# Patient Record
Sex: FEMALE | Race: ASIAN | URBAN - METROPOLITAN AREA
[De-identification: names, ages, dates, MRNs, and addresses within clinical notes are randomized per-mention and may not be internally consistent; named-entity substitution may affect disease eponyms.]

---

## 2018-07-13 ENCOUNTER — OFFICE VISIT (OUTPATIENT)
Dept: INTERNAL MEDICINE CLINIC | Age: 58
End: 2018-07-13

## 2018-07-13 VITALS
DIASTOLIC BLOOD PRESSURE: 82 MMHG | HEIGHT: 61 IN | OXYGEN SATURATION: 97 % | RESPIRATION RATE: 16 BRPM | TEMPERATURE: 98.3 F | SYSTOLIC BLOOD PRESSURE: 138 MMHG | HEART RATE: 85 BPM | BODY MASS INDEX: 29.76 KG/M2 | WEIGHT: 157.6 LBS

## 2018-07-13 DIAGNOSIS — M17.0 PRIMARY OSTEOARTHRITIS OF BOTH KNEES: Primary | ICD-10-CM

## 2018-07-13 DIAGNOSIS — R21 RASH AND NONSPECIFIC SKIN ERUPTION: ICD-10-CM

## 2018-07-13 DIAGNOSIS — M54.2 NECK PAIN: ICD-10-CM

## 2018-07-13 DIAGNOSIS — R53.82 CHRONIC FATIGUE: ICD-10-CM

## 2018-07-13 NOTE — MR AVS SNAPSHOT
[facility] [address] 
[phone] Patient: Zoila Ballesteros MRN: [id number] EGT:[id number]/7/0056 Visit Information Date & Time Provider Department Dept. Phone Encounter #  
 7/13/2018 12:45 PM Manuel Espinoza MD [facility] Internal Medicine [phone] [id number] Upcoming Health Maintenance Date Due DTaP/Tdap/Td series (1 - Tdap) 1/1/1981 PAP AKA CERVICAL CYTOLOGY 1/1/1981 BREAST CANCER SCRN MAMMOGRAM 1/1/2010 FOBT Q 1 YEAR AGE 50-75 1/1/2010 Influenza Age 5 to Adult 8/1/2018 Allergies as of 7/13/2018  Review Complete On: 7/13/2018 By: Manuel Espinoza MD  
 No Known Allergies Current Immunizations  Never Reviewed No immunizations on file. Not reviewed this visit You Were Diagnosed With   
  
 Codes Comments Primary osteoarthritis of both knees    -  Primary ICD-10-CM: M17.0 ICD-9-CM: 715.16 Neck pain     ICD-10-CM: M54.2 ICD-9-CM: 723.1 Rash and nonspecific skin eruption     ICD-10-CM: R21 
ICD-9-CM: 782.1 Chronic fatigue     ICD-10-CM: R53.82 
ICD-9-CM: 780.79 Vitals BP Pulse Temp Resp Height(growth percentile) Weight(growth percentile) 138/82 (BP 1 Location: Left arm, BP Patient Position: Sitting) 85 98.3 °F (36.8 °C) (Oral) 16 5' 1\" (1.549 m) 157 lb 9.6 oz (71.5 kg) SpO2 BMI OB Status Smoking Status 97% 29.78 kg/m2 Postmenopausal Never Smoker BMI and BSA Data Body Mass Index Body Surface Area  
 29.78 kg/m 2 1.75 m 2 Preferred Pharmacy Pharmacy Name Phone [phone] Your Updated Medication List  
  
Notice  As of 7/13/2018  2:11 PM  
 You have not been prescribed any medications. We Performed the Following CBC W/O DIFF [49681 CPT(R)] METABOLIC PANEL, COMPREHENSIVE [22514 CPT(R)] TSH 3RD GENERATION [32704 CPT(R)] Introducing [facility]! [employer] introduces [employer] patient portal. Now you can access parts of your medical record, email your doctor's office, and request medication refills online. 1. In your internet browser, go to [email or web address] 2. Click on the First Time User? Click Here link in the Sign In box. You will see the New Member Sign Up page. 3. Enter your [employer] Access Code exactly as it appears below. You will not need to use this code after youve completed the sign-up process. If you do not sign up before the expiration date, you must request a new code. · [employer] Access Code: [email or web address] Expires: 10/11/2018  1:27 PM 
 
4. Enter the last four digits of your Social Security Number (xxxx) and Date of Birth (mm/dd/yyyy) as indicated and click Submit. You will be taken to the next sign-up page. 5. Create a [employer] ID. This will be your [employer] login ID and cannot be changed, so think of one that is secure and easy to remember. 6. Create a [employer] password. You can change your password at any time. 7. Enter your Password Reset Question and Answer. This can be used at a later time if you forget your password. 8. Enter your e-mail address. You will receive e-mail notification when new information is available in [address] 9. Click Sign Up. You can now view and download portions of your medical record. 10. Click the Download Summary menu link to download a portable copy of your medical information. If you have questions, please visit the Frequently Asked Questions section of the [employer] website. Remember, [employer] is NOT to be used for urgent needs. For medical emergencies, dial 911. Now available from your iPhone and Android! Please provide this summary of care documentation to your next provider. If you have any questions after today's visit, please call [phone].

## 2018-07-13 NOTE — PROGRESS NOTES
Written by Amina Arnold, as dictated by Dr. Quintin Perdue MD.    Ajay Barraza is a 62 y.o. female. HPI  The patient comes in today to establish care and recently moved to the [address] [place] [address],[address] from [facility] 6 months ago. She has a hx of osteoarthritis and was seen by a physician in [facility] and was given injections, which helped. She has been having pain in both knees, R more than L. Has not taken any oral medication as she is not familiar with medications here. She has been feeling very tired. She is not sure if it is due to stress. She has never been diagnosed with anemia or any other automimmune condition before. She has also been feeling congested and has been seeing rashes off and on. She started experiencing pain in her neck, which is usually worse in the morning and gets better as the day progresses. No current outpatient prescriptions on file prior to visit. No current facility-administered medications on file prior to visit. Family History   Problem Relation Age of Onset    Diabetes Mother     Hypertension Father        Social History     Social History    Marital status:      Spouse name: N/A    Number of children: N/A    Years of education: N/A     Occupational History    Not on file. Social History Main Topics    Smoking status: Never Smoker    Smokeless tobacco: Never Used    Alcohol use No    Drug use: No    Sexual activity: Yes     Partners: Male     Other Topics Concern    Not on file     Social History Narrative    No narrative on file       Review of Systems   Constitutional: Positive for malaise/fatigue. HENT: Positive for congestion. Eyes: Negative for blurred vision and pain. Respiratory: Negative for cough and shortness of breath. Cardiovascular: Negative for chest pain and palpitations. Gastrointestinal: Negative for abdominal pain and heartburn. Genitourinary: Negative for frequency and urgency. Musculoskeletal: Positive for joint pain and neck pain. Negative for myalgias. Skin: Positive for rash. Neurological: Negative for dizziness, tingling, sensory change, weakness and headaches. Psychiatric/Behavioral: Negative for depression, memory loss and substance abuse. Visit Vitals    /82 (BP 1 Location: Left arm, BP Patient Position: Sitting)    Pulse 85    Temp 98.3 °F (36.8 °C) (Oral)    Resp 16    Ht 5' 1\" (1.549 m)    Wt 157 lb 9.6 oz (71.5 kg)    SpO2 97%    BMI 29.78 kg/m2       Physical Exam   Constitutional: She is oriented to person, place, and time. She appears well-developed and well-nourished. No distress. HENT:   Right Ear: External ear normal.   Left Ear: External ear normal.   Eyes: Conjunctivae and EOM are normal. Right eye exhibits no discharge. Left eye exhibits no discharge. Neck: Normal range of motion. Neck supple. Cardiovascular: Normal rate and regular rhythm. Pulmonary/Chest: Effort normal and breath sounds normal. She has no wheezes. Abdominal: Soft. Bowel sounds are normal. There is no tenderness. Musculoskeletal:   BL knee crepitus   Lymphadenopathy:     She has no cervical adenopathy. Neurological: She is alert and oriented to person, place, and time. Skin: She is not diaphoretic. Psychiatric: She has a normal mood and affect. Her behavior is normal.   Nursing note and vitals reviewed. ASSESSMENT and PLAN    ICD-10-CM ICD-9-CM    1. Primary osteoarthritis of both knees M17.0 715.16 Recommended taking Advil, alternating with tylenol arthritis, as she does not have insurance and cannot see ortho at this time. 2. Neck pain M54.2 723.1 Tylenol arthritis should help with this. She cannot have an XR done as she was to pay out of pocket and would like to know the cost.   3. Rash and nonspecific skin eruption R21 782. 1 She should take OTC antihistamine as she is most likely allergic to something at home.    4. Chronic fatigue R53.82 780.79 CBC W/O DIFF      METABOLIC PANEL, COMPREHENSIVE      TSH 3RD GENERATION    Basic labs ordered. Told her it may be stress related. This plan was reviewed with the patient and patient agrees. All questions were answered. This scribe documentation was reviewed by me and accurately reflects the examination and decisions made by me. This note will not be viewable in [address]

## 2021-04-29 ENCOUNTER — OFFICE (OUTPATIENT)
Dept: URBAN - METROPOLITAN AREA CLINIC 76 | Facility: CLINIC | Age: 61
Setting detail: OPHTHALMOLOGY
End: 2021-04-29
Payer: MEDICAID

## 2021-04-29 DIAGNOSIS — H40.032: ICD-10-CM

## 2021-04-29 DIAGNOSIS — H52.4: ICD-10-CM

## 2021-04-29 DIAGNOSIS — E11.9: ICD-10-CM

## 2021-04-29 DIAGNOSIS — H25.12: ICD-10-CM

## 2021-04-29 PROCEDURE — 92136 OPHTHALMIC BIOMETRY: CPT | Performed by: OPHTHALMOLOGY

## 2021-04-29 PROCEDURE — 92015 DETERMINE REFRACTIVE STATE: CPT | Performed by: OPHTHALMOLOGY

## 2021-04-29 PROCEDURE — 92014 COMPRE OPH EXAM EST PT 1/>: CPT | Performed by: OPHTHALMOLOGY

## 2021-04-29 ASSESSMENT — KERATOMETRY
OS_K1K2_AVERAGE: 42.625
OS_AXISANGLE_DEGREES: 30
OD_K1POWER_DIOPTERS: 42.50
OD_K2POWER_DIOPTERS: 42.75
OS_K2POWER_DIOPTERS: 42.75
OS_AXISANGLE_DEGREES: 120
OD_CYLPOWER_DEGREES: 0.25
OS_K1POWER_DIOPTERS: 42.50
OD_K2POWER_DIOPTERS: 42.75
OS_CYLAXISANGLE_DEGREES: 30
OS_K1POWER_DIOPTERS: 42.50
OS_CYLPOWER_DEGREES: 0.25
OS_K2POWER_DIOPTERS: 42.75
OD_AXISANGLE2_DEGREES: 83
OD_K1POWER_DIOPTERS: 42.50
OD_AXISANGLE_DEGREES: 173
OS_AXISANGLE2_DEGREES: 30
OD_CYLAXISANGLE_DEGREES: 83
OD_K1K2_AVERAGE: 42.625
OD_AXISANGLE_DEGREES: 83

## 2021-04-29 ASSESSMENT — REFRACTION_AUTOREFRACTION
OS_CYLINDER: -1.00
OS_SPHERE: +3.00
OD_SPHERE: +1.25
OD_CYLINDER: -1.00
OD_AXIS: 86
OS_AXIS: 97

## 2021-04-29 ASSESSMENT — SPHEQUIV_DERIVED
OS_SPHEQUIV: 2.5
OD_SPHEQUIV: 0.75

## 2021-04-29 ASSESSMENT — AXIALLENGTH_DERIVED
OD_AL: 23.6211
OS_AL: 22.9569

## 2021-04-29 ASSESSMENT — VISUAL ACUITY
OS_BCVA: 20/25
OD_BCVA: 20/80

## 2021-04-29 ASSESSMENT — REFRACTION_MANIFEST
OD_SPHERE: PL
OD_ADD: +2.50
OS_SPHERE: +2.75
OS_VA1: 20/30
OD_VA1: 20/25
OS_ADD: +2.50

## 2021-04-29 ASSESSMENT — CONFRONTATIONAL VISUAL FIELD TEST (CVF)
OD_FINDINGS: FULL
OS_FINDINGS: FULL

## 2021-04-29 ASSESSMENT — TONOMETRY
OD_IOP_MMHG: 16
OS_IOP_MMHG: 19

## 2022-02-11 ENCOUNTER — OFFICE (OUTPATIENT)
Dept: URBAN - METROPOLITAN AREA CLINIC 76 | Facility: CLINIC | Age: 62
Setting detail: OPHTHALMOLOGY
End: 2022-02-11
Payer: MEDICAID

## 2022-02-11 DIAGNOSIS — E11.9: ICD-10-CM

## 2022-02-11 DIAGNOSIS — H40.032: ICD-10-CM

## 2022-02-11 DIAGNOSIS — H25.12: ICD-10-CM

## 2022-02-11 PROCEDURE — 92136 OPHTHALMIC BIOMETRY: CPT | Performed by: STUDENT IN AN ORGANIZED HEALTH CARE EDUCATION/TRAINING PROGRAM

## 2022-02-11 PROCEDURE — 99214 OFFICE O/P EST MOD 30 MIN: CPT | Performed by: STUDENT IN AN ORGANIZED HEALTH CARE EDUCATION/TRAINING PROGRAM

## 2022-02-11 ASSESSMENT — TONOMETRY
OD_IOP_MMHG: 17
OS_IOP_MMHG: 17

## 2022-02-11 ASSESSMENT — REFRACTION_MANIFEST
OS_VA1: 20/25-
OS_AXIS: 95
OD_CYLINDER: -1.00
OS_ADD: +2.50
OD_VA1: 20/25
OD_VA1: 20/20-1
OS_SPHERE: +2.75
OS_SPHERE: +3.00
OD_SPHERE: +1.25
OD_ADD: +2.50
OS_VA1: 20/30
OD_SPHERE: PL
OD_AXIS: 87
OS_CYLINDER: -1.00

## 2022-02-11 ASSESSMENT — SPHEQUIV_DERIVED
OS_SPHEQUIV: 2.625
OD_SPHEQUIV: 0.75
OD_SPHEQUIV: 0.75
OS_SPHEQUIV: 2.5

## 2022-02-11 ASSESSMENT — AXIALLENGTH_DERIVED
OS_AL: 22.9569
OD_AL: 23.6672
OD_AL: 23.6672
OS_AL: 22.9108

## 2022-02-11 ASSESSMENT — REFRACTION_AUTOREFRACTION
OS_SPHERE: +3.00
OD_AXIS: 87
OS_AXIS: 94
OS_CYLINDER: -0.75
OD_CYLINDER: -1.00
OD_SPHERE: +1.25

## 2022-02-11 ASSESSMENT — VISUAL ACUITY
OS_BCVA: 20/25
OD_BCVA: 20/100-2

## 2022-02-11 ASSESSMENT — KERATOMETRY
OS_K1POWER_DIOPTERS: 42.50
OD_K1POWER_DIOPTERS: 42.25
OD_K2POWER_DIOPTERS: 42.75
OS_AXISANGLE_DEGREES: 32
OD_AXISANGLE_DEGREES: 73
OS_K2POWER_DIOPTERS: 42.75

## 2022-02-11 ASSESSMENT — CONFRONTATIONAL VISUAL FIELD TEST (CVF)
OD_FINDINGS: FULL
OS_FINDINGS: FULL

## 2022-05-19 ENCOUNTER — OUTPATIENT HOSPITAL (OUTPATIENT)
Dept: URBAN - METROPOLITAN AREA CLINIC 78 | Facility: CLINIC | Age: 62
Setting detail: OPHTHALMOLOGY
End: 2022-05-19
Payer: MEDICAID

## 2022-05-19 DIAGNOSIS — H25.12: ICD-10-CM

## 2022-05-19 PROCEDURE — 66984 XCAPSL CTRC RMVL W/O ECP: CPT | Performed by: STUDENT IN AN ORGANIZED HEALTH CARE EDUCATION/TRAINING PROGRAM

## 2022-05-20 ENCOUNTER — RX ONLY (RX ONLY)
Age: 62
End: 2022-05-20

## 2022-05-20 ENCOUNTER — OFFICE (OUTPATIENT)
Dept: URBAN - METROPOLITAN AREA CLINIC 76 | Facility: CLINIC | Age: 62
Setting detail: OPHTHALMOLOGY
End: 2022-05-20
Payer: MEDICAID

## 2022-05-20 DIAGNOSIS — E11.9: ICD-10-CM

## 2022-05-20 DIAGNOSIS — Z96.1: ICD-10-CM

## 2022-05-20 DIAGNOSIS — H25.12: ICD-10-CM

## 2022-05-20 DIAGNOSIS — H40.032: ICD-10-CM

## 2022-05-20 PROCEDURE — 99024 POSTOP FOLLOW-UP VISIT: CPT | Performed by: OPHTHALMOLOGY

## 2022-05-20 ASSESSMENT — SPHEQUIV_DERIVED
OS_SPHEQUIV: -0.5
OD_SPHEQUIV: 0.75
OS_SPHEQUIV: 2.5
OD_SPHEQUIV: 0.75

## 2022-05-20 ASSESSMENT — REFRACTION_MANIFEST
OS_SPHERE: +2.75
OS_VA1: 20/25-
OD_VA1: 20/25
OD_AXIS: 87
OD_SPHERE: +1.25
OD_SPHERE: PL
OS_CYLINDER: -1.00
OD_ADD: +2.50
OS_ADD: +2.50
OS_SPHERE: +3.00
OD_VA1: 20/20-1
OS_VA1: 20/30
OD_CYLINDER: -1.00
OS_AXIS: 95

## 2022-05-20 ASSESSMENT — VISUAL ACUITY
OS_BCVA: 20/25
OD_BCVA: 20/60

## 2022-05-20 ASSESSMENT — REFRACTION_AUTOREFRACTION
OS_SPHERE: 0.00
OD_CYLINDER: -1.00
OS_CYLINDER: -1.00
OD_AXIS: 89
OD_SPHERE: +1.25
OS_AXIS: 94

## 2022-05-20 ASSESSMENT — CONFRONTATIONAL VISUAL FIELD TEST (CVF)
OS_FINDINGS: FULL
OD_FINDINGS: FULL

## 2022-05-20 ASSESSMENT — KERATOMETRY
OS_K1POWER_DIOPTERS: 42.50
OD_K2POWER_DIOPTERS: 42.75
OD_K1POWER_DIOPTERS: 42.50
OS_AXISANGLE_DEGREES: 44
OS_K2POWER_DIOPTERS: 42.75
OD_AXISANGLE_DEGREES: 86

## 2022-05-20 ASSESSMENT — AXIALLENGTH_DERIVED
OD_AL: 23.6211
OS_AL: 24.1196
OS_AL: 22.9569
OD_AL: 23.6211

## 2022-05-20 ASSESSMENT — TONOMETRY
OS_IOP_MMHG: 18
OD_IOP_MMHG: 19

## 2022-05-25 ENCOUNTER — OFFICE (OUTPATIENT)
Dept: URBAN - METROPOLITAN AREA CLINIC 76 | Facility: CLINIC | Age: 62
Setting detail: OPHTHALMOLOGY
End: 2022-05-25
Payer: MEDICAID

## 2022-05-25 DIAGNOSIS — Z96.1: ICD-10-CM

## 2022-05-25 DIAGNOSIS — H40.032: ICD-10-CM

## 2022-05-25 DIAGNOSIS — E11.9: ICD-10-CM

## 2022-05-25 PROBLEM — H25.12 CATARACT SENILE NUCLEAR SCLEROSIS; LEFT EYE: Status: RESOLVED | Noted: 2021-04-29 | Resolved: 2022-05-25

## 2022-05-25 PROCEDURE — 99024 POSTOP FOLLOW-UP VISIT: CPT | Performed by: STUDENT IN AN ORGANIZED HEALTH CARE EDUCATION/TRAINING PROGRAM

## 2022-05-25 ASSESSMENT — REFRACTION_MANIFEST
OD_SPHERE: +0.50
OS_AXIS: 95
OD_VA1: 20/20-1
OS_ADD: +2.50
OD_ADD: +2.50
OS_CYLINDER: -1.25
OD_CYLINDER: -0.50
OS_SPHERE: +3.00
OS_CYLINDER: -1.00
OS_VA1: 20/25-
OS_AXIS: 89
OD_VA1: 20/25
OD_AXIS: 83
OS_VA1: 20/25
OD_AXIS: 87
OD_SPHERE: +1.25
OS_SPHERE: -0.25
OD_CYLINDER: -1.00

## 2022-05-25 ASSESSMENT — SPHEQUIV_DERIVED
OD_SPHEQUIV: 0.25
OD_SPHEQUIV: 0.625
OS_SPHEQUIV: -0.875
OD_SPHEQUIV: 0.75
OS_SPHEQUIV: 2.5
OS_SPHEQUIV: -0.875

## 2022-05-25 ASSESSMENT — AXIALLENGTH_DERIVED
OS_AL: 24.322
OD_AL: 23.6701
OS_AL: 24.322
OD_AL: 23.6211
OD_AL: 23.818
OS_AL: 23.0004

## 2022-05-25 ASSESSMENT — REFRACTION_AUTOREFRACTION
OD_CYLINDER: -1.25
OD_SPHERE: +1.25
OD_AXIS: 83
OS_CYLINDER: -1.25
OS_AXIS: 89
OS_SPHERE: -0.25

## 2022-05-25 ASSESSMENT — CONFRONTATIONAL VISUAL FIELD TEST (CVF)
OD_FINDINGS: FULL
OS_FINDINGS: FULL

## 2022-05-25 ASSESSMENT — TONOMETRY
OD_IOP_MMHG: 17
OS_IOP_MMHG: 19

## 2022-05-25 ASSESSMENT — KERATOMETRY
OS_K2POWER_DIOPTERS: 42.50
OD_K2POWER_DIOPTERS: 42.75
OD_K1POWER_DIOPTERS: 42.50
OS_K1POWER_DIOPTERS: 42.50
OS_AXISANGLE_DEGREES: 90
OD_AXISANGLE_DEGREES: 92

## 2022-05-25 ASSESSMENT — VISUAL ACUITY
OS_BCVA: 20/25
OD_BCVA: 20/100

## 2022-06-22 ENCOUNTER — OFFICE (OUTPATIENT)
Dept: URBAN - METROPOLITAN AREA CLINIC 76 | Facility: CLINIC | Age: 62
Setting detail: OPHTHALMOLOGY
End: 2022-06-22
Payer: MEDICAID

## 2022-06-22 DIAGNOSIS — E11.9: ICD-10-CM

## 2022-06-22 DIAGNOSIS — Z96.1: ICD-10-CM

## 2022-06-22 DIAGNOSIS — H40.032: ICD-10-CM

## 2022-06-22 DIAGNOSIS — H52.03: ICD-10-CM

## 2022-06-22 PROCEDURE — 92015 DETERMINE REFRACTIVE STATE: CPT | Performed by: STUDENT IN AN ORGANIZED HEALTH CARE EDUCATION/TRAINING PROGRAM

## 2022-06-22 PROCEDURE — 99024 POSTOP FOLLOW-UP VISIT: CPT | Performed by: STUDENT IN AN ORGANIZED HEALTH CARE EDUCATION/TRAINING PROGRAM

## 2022-06-22 ASSESSMENT — REFRACTION_MANIFEST
OS_CYLINDER: -1.00
OS_VA2: 20/20(J1+)
OD_CYLINDER: -1.00
OD_VA2: 20/20(J1+)
OD_AXIS: 87
OD_SPHERE: +0.50
OS_CYLINDER: -0.75
OS_AXIS: 85
OD_ADD: +2.50
OD_SPHERE: +1.25
OS_AXIS: 85
OS_CYLINDER: -1.25
OD_SPHERE: +3.75
OD_AXIS: 85
OD_VA1: 20/25
OD_CYLINDER: -1.00
OD_AXIS: 83
OD_CYLINDER: -1.00
OS_ADD: +2.50
OS_ADD: +2.50
OS_VA1: 20/25
OD_AXIS: 85
OS_SPHERE: PLANO
OS_AXIS: 95
OD_SPHERE: +1.25
OD_VA1: 20/20-
OS_VA1: 20/20-
OD_VA1: 20/20-1
OD_CYLINDER: -0.50
OS_CYLINDER: -0.75
OS_SPHERE: -0.25
OS_SPHERE: +2.50
OS_VA1: 20/25-
OD_ADD: +2.50
OS_AXIS: 89
OS_SPHERE: +3.00

## 2022-06-22 ASSESSMENT — SPHEQUIV_DERIVED
OD_SPHEQUIV: 3.25
OS_SPHEQUIV: 2.5
OD_SPHEQUIV: 0.75
OD_SPHEQUIV: 0.25
OS_SPHEQUIV: 2.125
OD_SPHEQUIV: 1
OS_SPHEQUIV: -0.875
OD_SPHEQUIV: 0.75

## 2022-06-22 ASSESSMENT — KERATOMETRY
OD_AXISANGLE_DEGREES: 84
OS_K1POWER_DIOPTERS: 42.50
OS_K2POWER_DIOPTERS: 42.75
OD_K2POWER_DIOPTERS: 42.75
OS_AXISANGLE_DEGREES: 64
OD_K1POWER_DIOPTERS: 42.50

## 2022-06-22 ASSESSMENT — AXIALLENGTH_DERIVED
OD_AL: 23.6211
OS_AL: 24.2733
OD_AL: 23.818
OS_AL: 23.096
OD_AL: 22.6835
OD_AL: 23.6211
OS_AL: 22.9569
OD_AL: 23.5239

## 2022-06-22 ASSESSMENT — TONOMETRY
OS_IOP_MMHG: 18
OD_IOP_MMHG: 19

## 2022-06-22 ASSESSMENT — REFRACTION_AUTOREFRACTION
OD_CYLINDER: -1.50
OS_CYLINDER: -1.00
OS_SPHERE: PLANO
OD_AXIS: 81
OD_SPHERE: +1.75
OS_AXIS: 87

## 2022-06-22 ASSESSMENT — CONFRONTATIONAL VISUAL FIELD TEST (CVF)
OD_FINDINGS: FULL
OS_FINDINGS: FULL

## 2022-06-22 ASSESSMENT — VISUAL ACUITY
OD_BCVA: 20/25-2
OS_BCVA: 20/25

## 2025-07-28 LAB
BASOPHILS # BLD: 0.1 K/UL (ref 0–0.1)
BASOPHILS NFR BLD: 0.8 % (ref 0–1)
COMMENT:: NORMAL
DIFFERENTIAL METHOD BLD: ABNORMAL
EOSINOPHIL # BLD: 0.12 K/UL (ref 0–0.4)
EOSINOPHIL NFR BLD: 0.9 % (ref 0–7)
ERYTHROCYTE [DISTWIDTH] IN BLOOD BY AUTOMATED COUNT: 13.1 % (ref 11.5–14.5)
HCT VFR BLD AUTO: 41.3 % (ref 35–47)
HGB BLD-MCNC: 13.8 G/DL (ref 11.5–16)
IMM GRANULOCYTES # BLD AUTO: 0.04 K/UL (ref 0–0.04)
IMM GRANULOCYTES NFR BLD AUTO: 0.3 % (ref 0–0.5)
LYMPHOCYTES # BLD: 1.95 K/UL (ref 0.8–3.5)
LYMPHOCYTES NFR BLD: 15.4 % (ref 12–49)
MCH RBC QN AUTO: 28.8 PG (ref 26–34)
MCHC RBC AUTO-ENTMCNC: 33.4 G/DL (ref 30–36.5)
MCV RBC AUTO: 86 FL (ref 80–99)
MONOCYTES # BLD: 0.65 K/UL (ref 0–1)
MONOCYTES NFR BLD: 5.1 % (ref 5–13)
NEUTS SEG # BLD: 9.81 K/UL (ref 1.8–8)
NEUTS SEG NFR BLD: 77.5 % (ref 32–75)
NRBC # BLD: 0 K/UL (ref 0–0.01)
NRBC BLD-RTO: 0 PER 100 WBC
PLATELET # BLD AUTO: 328 K/UL (ref 150–400)
PMV BLD AUTO: 10.9 FL (ref 8.9–12.9)
RBC # BLD AUTO: 4.8 M/UL (ref 3.8–5.2)
SPECIMEN HOLD: NORMAL
WBC # BLD AUTO: 12.7 K/UL (ref 3.6–11)

## 2025-07-28 PROCEDURE — 96374 THER/PROPH/DIAG INJ IV PUSH: CPT

## 2025-07-28 PROCEDURE — 99285 EMERGENCY DEPT VISIT HI MDM: CPT

## 2025-07-28 PROCEDURE — 85025 COMPLETE CBC W/AUTO DIFF WBC: CPT

## 2025-07-28 PROCEDURE — 80053 COMPREHEN METABOLIC PANEL: CPT

## 2025-07-28 RX ORDER — ONDANSETRON 2 MG/ML
4 INJECTION INTRAMUSCULAR; INTRAVENOUS
Status: COMPLETED | OUTPATIENT
Start: 2025-07-28 | End: 2025-07-29

## 2025-07-28 RX ORDER — MECLIZINE HCL 12.5 MG 12.5 MG/1
25 TABLET ORAL ONCE
Status: COMPLETED | OUTPATIENT
Start: 2025-07-28 | End: 2025-07-29

## 2025-07-28 RX ORDER — 0.9 % SODIUM CHLORIDE 0.9 %
1000 INTRAVENOUS SOLUTION INTRAVENOUS ONCE
Status: COMPLETED | OUTPATIENT
Start: 2025-07-28 | End: 2025-07-29

## 2025-07-28 ASSESSMENT — PAIN SCALES - GENERAL: PAINLEVEL_OUTOF10: 0

## 2025-07-28 ASSESSMENT — PAIN - FUNCTIONAL ASSESSMENT: PAIN_FUNCTIONAL_ASSESSMENT: 0-10

## 2025-07-29 ENCOUNTER — APPOINTMENT (OUTPATIENT)
Facility: HOSPITAL | Age: 65
End: 2025-07-29
Payer: COMMERCIAL

## 2025-07-29 ENCOUNTER — HOSPITAL ENCOUNTER (EMERGENCY)
Facility: HOSPITAL | Age: 65
Discharge: HOME | End: 2025-07-29
Attending: STUDENT IN AN ORGANIZED HEALTH CARE EDUCATION/TRAINING PROGRAM
Payer: COMMERCIAL

## 2025-07-29 VITALS
DIASTOLIC BLOOD PRESSURE: 60 MMHG | OXYGEN SATURATION: 99 % | HEART RATE: 70 BPM | WEIGHT: 138.89 LBS | TEMPERATURE: 97.5 F | SYSTOLIC BLOOD PRESSURE: 110 MMHG | RESPIRATION RATE: 16 BRPM

## 2025-07-29 DIAGNOSIS — R11.0 NAUSEA: ICD-10-CM

## 2025-07-29 DIAGNOSIS — R42 DIZZINESS: Primary | ICD-10-CM

## 2025-07-29 LAB
ALBUMIN SERPL-MCNC: 3.6 G/DL (ref 3.5–5)
ALBUMIN/GLOB SERPL: 0.9 (ref 1.1–2.2)
ALP SERPL-CCNC: 100 U/L (ref 45–117)
ALT SERPL-CCNC: 23 U/L (ref 12–78)
ANION GAP SERPL CALC-SCNC: 5 MMOL/L (ref 2–12)
APPEARANCE UR: CLEAR
AST SERPL-CCNC: 14 U/L (ref 15–37)
BACTERIA URNS QL MICRO: NEGATIVE /HPF
BILIRUB SERPL-MCNC: 0.3 MG/DL (ref 0.2–1)
BILIRUB UR QL: NEGATIVE
BUN SERPL-MCNC: 12 MG/DL (ref 6–20)
BUN/CREAT SERPL: 21 (ref 12–20)
CALCIUM SERPL-MCNC: 9.2 MG/DL (ref 8.5–10.1)
CHLORIDE SERPL-SCNC: 106 MMOL/L (ref 97–108)
CO2 SERPL-SCNC: 25 MMOL/L (ref 21–32)
COLOR UR: NORMAL
CREAT SERPL-MCNC: 0.58 MG/DL (ref 0.55–1.02)
EPITH CASTS URNS QL MICRO: NORMAL /LPF
GLOBULIN SER CALC-MCNC: 3.8 G/DL (ref 2–4)
GLUCOSE SERPL-MCNC: 130 MG/DL (ref 65–100)
GLUCOSE UR STRIP.AUTO-MCNC: NEGATIVE MG/DL
HGB UR QL STRIP: NEGATIVE
KETONES UR QL STRIP.AUTO: NEGATIVE MG/DL
LEUKOCYTE ESTERASE UR QL STRIP.AUTO: NEGATIVE
NITRITE UR QL STRIP.AUTO: NEGATIVE
PH UR STRIP: 7 (ref 5–8)
POTASSIUM SERPL-SCNC: 3.8 MMOL/L (ref 3.5–5.1)
PROT SERPL-MCNC: 7.4 G/DL (ref 6.4–8.2)
PROT UR STRIP-MCNC: NEGATIVE MG/DL
RBC #/AREA URNS HPF: NORMAL /HPF (ref 0–5)
SODIUM SERPL-SCNC: 136 MMOL/L (ref 136–145)
SP GR UR REFRACTOMETRY: >1.03
SPECIMEN HOLD: NORMAL
UROBILINOGEN UR QL STRIP.AUTO: 0.2 EU/DL (ref 0.2–1)
WBC URNS QL MICRO: NORMAL /HPF (ref 0–4)

## 2025-07-29 PROCEDURE — 6370000000 HC RX 637 (ALT 250 FOR IP): Performed by: STUDENT IN AN ORGANIZED HEALTH CARE EDUCATION/TRAINING PROGRAM

## 2025-07-29 PROCEDURE — 96374 THER/PROPH/DIAG INJ IV PUSH: CPT

## 2025-07-29 PROCEDURE — 6360000002 HC RX W HCPCS: Performed by: STUDENT IN AN ORGANIZED HEALTH CARE EDUCATION/TRAINING PROGRAM

## 2025-07-29 PROCEDURE — 6360000004 HC RX CONTRAST MEDICATION: Performed by: STUDENT IN AN ORGANIZED HEALTH CARE EDUCATION/TRAINING PROGRAM

## 2025-07-29 PROCEDURE — 70498 CT ANGIOGRAPHY NECK: CPT

## 2025-07-29 PROCEDURE — 81001 URINALYSIS AUTO W/SCOPE: CPT

## 2025-07-29 PROCEDURE — 70496 CT ANGIOGRAPHY HEAD: CPT

## 2025-07-29 PROCEDURE — 2580000003 HC RX 258: Performed by: STUDENT IN AN ORGANIZED HEALTH CARE EDUCATION/TRAINING PROGRAM

## 2025-07-29 PROCEDURE — 70450 CT HEAD/BRAIN W/O DYE: CPT

## 2025-07-29 RX ORDER — ONDANSETRON 4 MG/1
4 TABLET, ORALLY DISINTEGRATING ORAL 3 TIMES DAILY PRN
Qty: 21 TABLET | Refills: 0 | Status: SHIPPED | OUTPATIENT
Start: 2025-07-29

## 2025-07-29 RX ORDER — MECLIZINE HYDROCHLORIDE 25 MG/1
25 TABLET ORAL 3 TIMES DAILY PRN
Qty: 15 TABLET | Refills: 0 | Status: SHIPPED | OUTPATIENT
Start: 2025-07-29 | End: 2025-08-08

## 2025-07-29 RX ORDER — IOPAMIDOL 755 MG/ML
100 INJECTION, SOLUTION INTRAVASCULAR
Status: COMPLETED | OUTPATIENT
Start: 2025-07-29 | End: 2025-07-29

## 2025-07-29 RX ADMIN — MECLIZINE 25 MG: 12.5 TABLET ORAL at 01:47

## 2025-07-29 RX ADMIN — SODIUM CHLORIDE 1000 ML: 0.9 INJECTION, SOLUTION INTRAVENOUS at 01:46

## 2025-07-29 RX ADMIN — IOPAMIDOL 100 ML: 755 INJECTION, SOLUTION INTRAVENOUS at 00:57

## 2025-07-29 RX ADMIN — ONDANSETRON 4 MG: 2 INJECTION, SOLUTION INTRAMUSCULAR; INTRAVENOUS at 01:46

## 2025-07-29 ASSESSMENT — PAIN - FUNCTIONAL ASSESSMENT: PAIN_FUNCTIONAL_ASSESSMENT: NONE - DENIES PAIN

## 2025-07-29 NOTE — ED TRIAGE NOTES
Pt presents from home w/ CC N/V, dizziness sudden onset four hours ago. States she took OTC, cannot recall name, around the same time for N/V without relief. Denies blood thinner use.     Dr. Pruitt assessing during triage.

## 2025-07-29 NOTE — DISCHARGE INSTRUCTIONS
Your testing in the ER was reassuring.  There was no evidence of urinary infection, low blood counts, brain bleed, or other emergency conditions.  You felt improved after medications.  Take the prescribed medicines as needed for any return of symptoms.  If there is significant change or worsening or any other concerns return to the emergency department for further care.  Otherwise please follow-up with the balance and dizziness specialist Dr. Luz for further care as soon as possible using the number provided.

## 2025-07-30 NOTE — ED PROVIDER NOTES
[facility] EMERGENCY DEPARTMENT  EMERGENCY DEPARTMENT ENCOUNTER      Pt Name: Adrienne Magallon  MRN: [id number]  Birthdate 1960  Date of evaluation: 7/28/2025  Provider: Den Pruitt MD    CHIEF COMPLAINT       Chief Complaint   Patient presents with    Nausea    Vomiting    Dizziness       HISTORY OF PRESENT ILLNESS    HPI    65-year-old female who denies any past medical history here for nausea, vomiting and dizziness for the past 4 or 5 hours.  States it started  suddenly without obvious provocation.  States in usual state of health yesterday, no recent falls or head trauma.  Her son at bedside is translating.  He states she gets similar episodes every few months, usually responds to some over-the-counter treatments.  Has never seen a doctor about this.  States it often is triggered by some injection that her PCP gives her for \"weak bones.\"  He is unsure what this medicine is.    Patient denies chest pain or shortness of breath.  Denies vision changes extremity numbness tingling or weakness.  She is still able to ambulate.  Nursing notes reviewed.    REVIEW OF SYSTEMS     Review of Systems  Unless otherwise stated, a complete review of systems was asked of the patient. Pertinent positives are noted in the HPI section.    PAST MEDICAL HISTORY   No past medical history on file.    SURGICAL HISTORY     No past surgical history on file.    CURRENT MEDICATIONS       Discharge Medication List as of 7/29/2025  4:14 AM          ALLERGIES     Patient has no known allergies.    FAMILY HISTORY     No family history on file.     SOCIAL HISTORY       Social History     Socioeconomic History    Marital status:        PHYSICAL EXAM       ED Triage Vitals [07/28/25 2248]   BP Systolic BP Percentile Diastolic BP Percentile Temp Temp Source Pulse Respirations SpO2   (!) 161/77 -- -- 97.5 °F (36.4 °C) Oral 73 18 96 %      Height Weight - Scale         -- 63 kg (138 lb 14.2 oz)           Physical  Exam  Constitutional:       Appearance: Normal appearance.   HENT:      Head: Normocephalic and atraumatic.      Nose: Nose normal.      Mouth/Throat:      Mouth: Mucous membranes are moist.   Eyes:      General: No scleral icterus.     Extraocular Movements: Extraocular movements intact.   Cardiovascular:      Rate and Rhythm: Normal rate.      Pulses: Normal pulses.   Pulmonary:      Effort: Pulmonary effort is normal.      Breath sounds: Normal breath sounds.   Abdominal:      General: Abdomen is flat.   Musculoskeletal:         General: No deformity or signs of injury.      Cervical back: Normal range of motion and neck supple.   Skin:     General: Skin is warm.   Neurological:      General: No focal deficit present.      Mental Status: She is alert.      Comments: CN's intact.  5/5 strength.  Normal sensation  Normal gait  Normal finger nose and heel shin testing   Psychiatric:         Mood and Affect: Mood normal.         DIAGNOSTIC RESULTS   EKG: If obtained, EKG interpretation provided in the ED course    RADIOLOGY:   CT Head W/O Contrast   Final Result   No acute intracranial abnormalities.         Electronically signed by JORI Toscano      CTA HEAD NECK W CONTRAST   Final Result   There is no major vessel occlusion.      There is no acute intracranial process.   There is no aneurysm, dissection or hemodynamically significant stenosis.                        Electronically signed by HIMA LOERA           LABS:  Labs Reviewed   COMPREHENSIVE METABOLIC PANEL - Abnormal; Notable for the following components:       Result Value    Glucose 130 (*)     BUN/Creatinine Ratio 21 (*)     AST 14 (*)     Albumin/Globulin Ratio 0.9 (*)     All other components within normal limits   CBC WITH AUTO DIFFERENTIAL - Abnormal; Notable for the following components:    WBC 12.7 (*)     Neutrophils % 77.5 (*)     Neutrophils Absolute 9.81 (*)     All other components within normal limits   URINE CULTURE HOLD SAMPLE   URINALYSIS